# Patient Record
Sex: FEMALE | Race: WHITE | NOT HISPANIC OR LATINO | Employment: OTHER | URBAN - METROPOLITAN AREA
[De-identification: names, ages, dates, MRNs, and addresses within clinical notes are randomized per-mention and may not be internally consistent; named-entity substitution may affect disease eponyms.]

---

## 2019-11-04 ENCOUNTER — TELEPHONE (OUTPATIENT)
Dept: GASTROENTEROLOGY | Facility: CLINIC | Age: 74
End: 2019-11-04

## 2019-11-04 ENCOUNTER — OFFICE VISIT (OUTPATIENT)
Dept: GASTROENTEROLOGY | Facility: CLINIC | Age: 74
End: 2019-11-04
Payer: COMMERCIAL

## 2019-11-04 VITALS
HEIGHT: 58 IN | SYSTOLIC BLOOD PRESSURE: 160 MMHG | WEIGHT: 266 LBS | BODY MASS INDEX: 55.84 KG/M2 | HEART RATE: 75 BPM | RESPIRATION RATE: 18 BRPM | DIASTOLIC BLOOD PRESSURE: 60 MMHG

## 2019-11-04 DIAGNOSIS — Z12.11 SCREENING FOR COLON CANCER: Primary | ICD-10-CM

## 2019-11-04 PROCEDURE — 99203 OFFICE O/P NEW LOW 30 MIN: CPT | Performed by: PHYSICIAN ASSISTANT

## 2019-11-04 RX ORDER — CARVEDILOL 6.25 MG/1
6.25 TABLET ORAL 2 TIMES DAILY WITH MEALS
COMMUNITY

## 2019-11-04 RX ORDER — OXYBUTYNIN CHLORIDE 5 MG/1
5 TABLET, EXTENDED RELEASE ORAL DAILY
COMMUNITY

## 2019-11-04 RX ORDER — LATANOPROST 50 UG/ML
1 SOLUTION/ DROPS OPHTHALMIC
COMMUNITY

## 2019-11-04 RX ORDER — FUROSEMIDE 20 MG/1
20 TABLET ORAL 2 TIMES DAILY
COMMUNITY

## 2019-11-04 RX ORDER — INSULIN GLARGINE 100 [IU]/ML
INJECTION, SOLUTION SUBCUTANEOUS
COMMUNITY

## 2019-11-04 RX ORDER — ROSUVASTATIN CALCIUM 40 MG/1
40 TABLET, COATED ORAL DAILY
COMMUNITY

## 2019-11-04 RX ORDER — FLUTICASONE PROPIONATE 50 MCG
1 SPRAY, SUSPENSION (ML) NASAL DAILY
COMMUNITY

## 2019-11-04 RX ORDER — EZETIMIBE 10 MG/1
10 TABLET ORAL DAILY
COMMUNITY

## 2019-11-04 RX ORDER — LISINOPRIL 10 MG/1
10 TABLET ORAL DAILY
COMMUNITY

## 2019-11-04 RX ORDER — OMEGA-3S/DHA/EPA/FISH OIL/D3 300MG-1000
400 CAPSULE ORAL DAILY
COMMUNITY

## 2019-11-04 RX ORDER — HYDROQUINONE 40 MG/G
CREAM TOPICAL 2 TIMES DAILY
COMMUNITY

## 2019-11-04 RX ORDER — ALENDRONATE SODIUM 70 MG/1
70 TABLET ORAL
COMMUNITY

## 2019-11-04 NOTE — LETTER
November 6, 2019     [address]  [address] [place] [address]    Patient: Tom Tian   YOB: 1945   Date of Visit: 11/4/2019       Dear Dr Cedric Dallas: Thank you for referring Lalita Corenjo to me for evaluation  Below are my notes for this consultation  If you have questions, please do not hesitate to call me  I look forward to following your patient along with you  Sincerely,        Ranjit Dominique PA-C        CC: No Recipients  Dany Ruiz  11/4/2019 10:16 AM  Attested  [facility] - Outpatient Consultation  Tom Tian 68 y o  female MRN: [id number]  Encounter: [id number]          ASSESSMENT AND PLAN:      1  Screening for colon cancer    Patient presents for screening colonoscopy  No family history of colon cancer or colon polyps  Will plan for colonoscopy to investigate     ________________________________________________________________    HPI:  Patient is a 78-year-old female who presents to the office for consultation regarding a screening colonoscopy  Patient reports that she has never had a colonoscopy before  She denies any family history of colon cancer or colon polyps  She denies any rectal bleeding or melena  She denies any problems with her bowel movements  She denies any abdominal pain  REVIEW OF SYSTEMS:    CONSTITUTIONAL: Denies any fever, chills, rigors, and weight loss  HEENT: No earache or tinnitus  Denies hearing loss or visual disturbances  CARDIOVASCULAR: No chest pain or palpitations  RESPIRATORY: Denies any cough, hemoptysis, shortness of breath or dyspnea on exertion  GASTROINTESTINAL: As noted in the History of Present Illness  GENITOURINARY: No problems with urination  Denies any hematuria or dysuria  NEUROLOGIC: No dizziness or vertigo, denies headaches  MUSCULOSKELETAL: Denies any muscle or joint pain     SKIN: Denies skin rashes or itching  ENDOCRINE: Denies excessive thirst  Denies intolerance to heat or cold  PSYCHOSOCIAL: Denies depression or anxiety  Denies any recent memory loss         Historical Information   Past Medical History:   Diagnosis Date    [facility]     [facility]     [facility]     Hyperlipemia     Hypertension     Vitamin D deficiency      Past Surgical History:   Procedure Laterality Date    CARDIAC SURGERY - Bioprosthetic Aortic Valve Replacement      CARPAL TUNNEL RELEASE      CATARACT EXTRACTION      DILATION AND CURETTAGE OF UTERUS      HYSTERECTOMY      LAPAROSCOPIC GASTRIC BANDING      LEG SURGERY Left     TUBAL LIGATION       Social History   Social History     Substance and Sexual Activity   Alcohol Use Never    Frequency: Never     Social History     Substance and Sexual Activity   Drug Use Never     Social History     Tobacco Use   Smoking Status Never Smoker   Smokeless Tobacco Never Used     Family History   Problem Relation Age of Onset    Heart disease Mother     Asthma Mother     Diabetes Mother     Heart disease Father        Meds/Allergies       Current Outpatient Medications:     alendronate (FOSAMAX) 70 mg tablet    ASPIRIN 81 PO    carvedilol (COREG) 6 25 mg tablet    Cholecalciferol (VITAMIN D3) 125 MCG (5000 UT) TABS    cholecalciferol (VITAMIN D3) 400 units tablet    Cyanocobalamin 5000 MCG CAPS    Dulaglutide (TRULICITY) 1 5 EV/1 5MV SOPN    ezetimibe (ZETIA) 10 mg tablet    fluticasone (FLONASE) 50 mcg/act nasal spray    furosemide (LASIX) 20 mg tablet    hydroquinone 4 % cream    insulin glargine (LANTUS) 100 units/mL subcutaneous injection    insulin lispro protamine-insulin lispro (HumaLOG 50-50) 100 units/mL    latanoprost (XALATAN) 0 005 % ophthalmic solution    lisinopril (ZESTRIL) 10 mg tablet    Omega-3 Fatty Acids (OMEGA 3 PO)    oxybutynin (DITROPAN-XL) 5 mg 24 hr tablet    rosuvastatin (CRESTOR) 40 MG tablet    Allergies Allergen Reactions    Other Rash     Insulin Detemir    Sulfa Antibiotics Hives and Rash           Objective     Blood pressure 160/60, pulse 75, resp  rate 18, height 4' 10" (1 473 m), weight 121 kg (266 lb)  Body mass index is 55 59 kg/m²  PHYSICAL EXAM:      General Appearance:   Alert, cooperative, no distress, obese   HEENT:   Normocephalic, atraumatic, anicteric     Neck:  Supple, symmetrical, trachea midline   Lungs:   Clear to auscultation bilaterally; no rales, rhonchi or wheezing; respirations unlabored    Heart[de-identified]   Regular rate and rhythm; + murmur; no rub, or gallop  Abdomen:   Soft, non-tender, non-distended; normal bowel sounds; no masses, no organomegaly    Genitalia:   Deferred    Rectal:   Deferred    Extremities:  No cyanosis, clubbing or edema    Pulses:  2+ and symmetric    Skin:  No jaundice, rashes, or lesions    Lymph nodes:  No palpable cervical lymphadenopathy        Lab Results:   No visits with results within 1 Day(s) from this visit  Latest known visit with results is:   No results found for any previous visit  Radiology Results:   No results found  Attestation signed by Apolinar Peña MD at 11/4/2019 10:51 AM:  I supervised the Advanced Practitioner  I reviewed the Advanced Practitioner note and agree      Apolinar Peña MD 11/04/19

## 2019-11-04 NOTE — PROGRESS NOTES
[facility] - Outpatient Consultation  Rosales Mcgrath 68 y o  female MRN: [id number]  Encounter: [id number]          ASSESSMENT AND PLAN:      1  Screening for colon cancer    Patient presents for screening colonoscopy  No family history of colon cancer or colon polyps  Will plan for colonoscopy to investigate     ________________________________________________________________    HPI:  Patient is a 32-year-old female who presents to the office for consultation regarding a screening colonoscopy  Patient reports that she has never had a colonoscopy before  She denies any family history of colon cancer or colon polyps  She denies any rectal bleeding or melena  She denies any problems with her bowel movements  She denies any abdominal pain  REVIEW OF SYSTEMS:    CONSTITUTIONAL: Denies any fever, chills, rigors, and weight loss  HEENT: No earache or tinnitus  Denies hearing loss or visual disturbances  CARDIOVASCULAR: No chest pain or palpitations  RESPIRATORY: Denies any cough, hemoptysis, shortness of breath or dyspnea on exertion  GASTROINTESTINAL: As noted in the History of Present Illness  GENITOURINARY: No problems with urination  Denies any hematuria or dysuria  NEUROLOGIC: No dizziness or vertigo, denies headaches  MUSCULOSKELETAL: Denies any muscle or joint pain  SKIN: Denies skin rashes or itching  ENDOCRINE: Denies excessive thirst  Denies intolerance to heat or cold  PSYCHOSOCIAL: Denies depression or anxiety  Denies any recent memory loss         Historical Information   Past Medical History:   Diagnosis Date    Cancer ([facility] )     Diabetes ([facility] )     Endometrial cancer ([facility] )     Hyperlipemia     Hypertension     Vitamin D deficiency      Past Surgical History:   Procedure Laterality Date    CARDIAC SURGERY - Bioprosthetic Aortic Valve Replacement      CARPAL TUNNEL RELEASE      CATARACT EXTRACTION      DILATION AND CURETTAGE OF UTERUS  HYSTERECTOMY      LAPAROSCOPIC GASTRIC BANDING      LEG SURGERY Left     TUBAL LIGATION       Social History   Social History     Substance and Sexual Activity   Alcohol Use Never    Frequency: Never     Social History     Substance and Sexual Activity   Drug Use Never     Social History     Tobacco Use   Smoking Status Never Smoker   Smokeless Tobacco Never Used     Family History   Problem Relation Age of Onset    Heart disease Mother     Asthma Mother     Diabetes Mother     Heart disease Father        Meds/Allergies       Current Outpatient Medications:     alendronate (FOSAMAX) 70 mg tablet    ASPIRIN 81 PO    carvedilol (COREG) 6 25 mg tablet    Cholecalciferol (VITAMIN D3) 125 MCG (5000 UT) TABS    cholecalciferol (VITAMIN D3) 400 units tablet    Cyanocobalamin 5000 MCG CAPS    Dulaglutide (TRULICITY) 1 5 AB/1 7PW SOPN    ezetimibe (ZETIA) 10 mg tablet    fluticasone (FLONASE) 50 mcg/act nasal spray    furosemide (LASIX) 20 mg tablet    hydroquinone 4 % cream    insulin glargine (LANTUS) 100 units/mL subcutaneous injection    insulin lispro protamine-insulin lispro (HumaLOG 50-50) 100 units/mL    latanoprost (XALATAN) 0 005 % ophthalmic solution    lisinopril (ZESTRIL) 10 mg tablet    Omega-3 Fatty Acids (OMEGA 3 PO)    oxybutynin (DITROPAN-XL) 5 mg 24 hr tablet    rosuvastatin (CRESTOR) 40 MG tablet    Allergies   Allergen Reactions    Other Rash     Insulin Detemir    Sulfa Antibiotics Hives and Rash           Objective     Blood pressure 160/60, pulse 75, resp  rate 18, height 4' 10" (1 473 m), weight 121 kg (266 lb)  Body mass index is 55 59 kg/m²          PHYSICAL EXAM:      General Appearance:   Alert, cooperative, no distress, obese   HEENT:   Normocephalic, atraumatic, anicteric     Neck:  Supple, symmetrical, trachea midline   Lungs:   Clear to auscultation bilaterally; no rales, rhonchi or wheezing; respirations unlabored    Heart[de-identified]   Regular rate and rhythm; + murmur; no rub, or gallop  Abdomen:   Soft, non-tender, non-distended; normal bowel sounds; no masses, no organomegaly    Genitalia:   Deferred    Rectal:   Deferred    Extremities:  No cyanosis, clubbing or edema    Pulses:  2+ and symmetric    Skin:  No jaundice, rashes, or lesions    Lymph nodes:  No palpable cervical lymphadenopathy        Lab Results:   No visits with results within 1 Day(s) from this visit  Latest known visit with results is:   No results found for any previous visit  Radiology Results:   No results found

## 2019-11-12 NOTE — TELEPHONE ENCOUNTER
CALLED NUMBER LISTED BUT PERSON WHO ANSWERED STATED I HAD WRONG NUMBER, NEED TO CONFIRM PHONE NUMBER IF PT CALLS BACK

## 2019-12-03 ENCOUNTER — TELEPHONE (OUTPATIENT)
Dept: GASTROENTEROLOGY | Facility: CLINIC | Age: 74
End: 2019-12-03

## 2019-12-03 NOTE — TELEPHONE ENCOUNTER
Tried calling both the number provided and the home number listed - both numbers without an answer and could not leave a message  Will await for patient to call back